# Patient Record
Sex: FEMALE | Race: OTHER | HISPANIC OR LATINO | URBAN - METROPOLITAN AREA
[De-identification: names, ages, dates, MRNs, and addresses within clinical notes are randomized per-mention and may not be internally consistent; named-entity substitution may affect disease eponyms.]

---

## 2019-11-14 ENCOUNTER — OUTPATIENT (OUTPATIENT)
Dept: OUTPATIENT SERVICES | Facility: HOSPITAL | Age: 59
LOS: 1 days | End: 2019-11-14
Payer: COMMERCIAL

## 2019-11-14 VITALS
RESPIRATION RATE: 16 BRPM | DIASTOLIC BLOOD PRESSURE: 93 MMHG | SYSTOLIC BLOOD PRESSURE: 161 MMHG | HEART RATE: 78 BPM | OXYGEN SATURATION: 100 % | WEIGHT: 156.09 LBS | HEIGHT: 64 IN | TEMPERATURE: 98 F

## 2019-11-14 DIAGNOSIS — Z98.891 HISTORY OF UTERINE SCAR FROM PREVIOUS SURGERY: Chronic | ICD-10-CM

## 2019-11-14 DIAGNOSIS — K60.1 CHRONIC ANAL FISSURE: ICD-10-CM

## 2019-11-14 DIAGNOSIS — Z01.818 ENCOUNTER FOR OTHER PREPROCEDURAL EXAMINATION: ICD-10-CM

## 2019-11-14 DIAGNOSIS — Z98.890 OTHER SPECIFIED POSTPROCEDURAL STATES: Chronic | ICD-10-CM

## 2019-11-14 DIAGNOSIS — Z86.018 PERSONAL HISTORY OF OTHER BENIGN NEOPLASM: Chronic | ICD-10-CM

## 2019-11-14 PROBLEM — Z00.00 ENCOUNTER FOR PREVENTIVE HEALTH EXAMINATION: Status: ACTIVE | Noted: 2019-11-14

## 2019-11-14 LAB
ANION GAP SERPL CALC-SCNC: 6 MMOL/L — SIGNIFICANT CHANGE UP (ref 5–17)
BUN SERPL-MCNC: 15 MG/DL — SIGNIFICANT CHANGE UP (ref 7–23)
CALCIUM SERPL-MCNC: 9.6 MG/DL — SIGNIFICANT CHANGE UP (ref 8.5–10.1)
CHLORIDE SERPL-SCNC: 105 MMOL/L — SIGNIFICANT CHANGE UP (ref 96–108)
CO2 SERPL-SCNC: 30 MMOL/L — SIGNIFICANT CHANGE UP (ref 22–31)
CREAT SERPL-MCNC: 0.68 MG/DL — SIGNIFICANT CHANGE UP (ref 0.5–1.3)
GLUCOSE SERPL-MCNC: 88 MG/DL — SIGNIFICANT CHANGE UP (ref 70–99)
HCT VFR BLD CALC: 46.3 % — HIGH (ref 34.5–45)
HGB BLD-MCNC: 15.6 G/DL — HIGH (ref 11.5–15.5)
MCHC RBC-ENTMCNC: 28.7 PG — SIGNIFICANT CHANGE UP (ref 27–34)
MCHC RBC-ENTMCNC: 33.7 GM/DL — SIGNIFICANT CHANGE UP (ref 32–36)
MCV RBC AUTO: 85.1 FL — SIGNIFICANT CHANGE UP (ref 80–100)
NRBC # BLD: 0 /100 WBCS — SIGNIFICANT CHANGE UP (ref 0–0)
PLATELET # BLD AUTO: 313 K/UL — SIGNIFICANT CHANGE UP (ref 150–400)
POTASSIUM SERPL-MCNC: 4.2 MMOL/L — SIGNIFICANT CHANGE UP (ref 3.5–5.3)
POTASSIUM SERPL-SCNC: 4.2 MMOL/L — SIGNIFICANT CHANGE UP (ref 3.5–5.3)
RBC # BLD: 5.44 M/UL — HIGH (ref 3.8–5.2)
RBC # FLD: 12.8 % — SIGNIFICANT CHANGE UP (ref 10.3–14.5)
SODIUM SERPL-SCNC: 141 MMOL/L — SIGNIFICANT CHANGE UP (ref 135–145)
WBC # BLD: 5.96 K/UL — SIGNIFICANT CHANGE UP (ref 3.8–10.5)
WBC # FLD AUTO: 5.96 K/UL — SIGNIFICANT CHANGE UP (ref 3.8–10.5)

## 2019-11-14 PROCEDURE — 93005 ELECTROCARDIOGRAM TRACING: CPT

## 2019-11-14 PROCEDURE — 36415 COLL VENOUS BLD VENIPUNCTURE: CPT

## 2019-11-14 PROCEDURE — 93010 ELECTROCARDIOGRAM REPORT: CPT

## 2019-11-14 PROCEDURE — 85027 COMPLETE CBC AUTOMATED: CPT

## 2019-11-14 PROCEDURE — G0463: CPT

## 2019-11-14 PROCEDURE — 80048 BASIC METABOLIC PNL TOTAL CA: CPT

## 2019-11-14 RX ORDER — LEVOCETIRIZINE DIHYDROCHLORIDE 0.5 MG/ML
0 SOLUTION ORAL
Qty: 90 | Refills: 0 | DISCHARGE

## 2019-11-14 RX ORDER — PLECANATIDE 3 MG/1
0 TABLET ORAL
Qty: 90 | Refills: 0 | DISCHARGE

## 2019-11-14 RX ORDER — AMLODIPINE BESYLATE 2.5 MG/1
0 TABLET ORAL
Qty: 90 | Refills: 0 | DISCHARGE

## 2019-11-14 RX ORDER — LIFITEGRAST 50 MG/ML
0 SOLUTION/ DROPS OPHTHALMIC
Qty: 180 | Refills: 0 | DISCHARGE

## 2019-11-14 NOTE — H&P PST ADULT - NSICDXPASTSURGICALHX_GEN_ALL_CORE_FT
PAST SURGICAL HISTORY:  H/O colonoscopy     History of 2  sections     S/P excision of lipoma right breast 30 years ago

## 2019-11-14 NOTE — H&P PST ADULT - HISTORY OF PRESENT ILLNESS
58 yo  female with HTN, presents to PST scheduled for exam under anesthesia  - placement of seton - anal biopsy on 11/21/2019 with Dr. Barrera 58 yo female with HTN, presents to PST scheduled for exam under anesthesia  - placement of seton - anal biopsy on 11/21/2019 with Dr. Barrera. Reports H/O intermittent flare ups of hemorrhoids. Noticed a rectal bump over a month ago. Dx with a fissure. Reports discomfort

## 2019-11-14 NOTE — H&P PST ADULT - NSICDXPROBLEM_GEN_ALL_CORE_FT
PROBLEM DIAGNOSES  Problem: Chronic anal fissure  Assessment and Plan: scheduled for exam under anesthesia  - placement of seton - anal biopsy on 11/21/2019 with Dr. Barrera       Problem: Pre-op evaluation  Assessment and Plan: Labs - CBC, BMP and EKG  MC with Dr. Yeh  Pre op  instructions reviewed and given. Take routine am meds DOS with sip of water. Avoid NSAIDs and OTC supplements. Verbalized understanding PROBLEM DIAGNOSES  Problem: Chronic anal fissure  Assessment and Plan: scheduled for exam under anesthesia  - placement of seton - anal biopsy on 11/21/2019 with Dr. Barrera       Problem: Pre-op evaluation  Assessment and Plan: Labs - CBC, BMP and EKG  MC with Dr. Mariscal  Pre op  instructions reviewed and given. Take all routine meds at night. Avoid NSAIDs and OTC supplements. Verbalized understanding

## 2019-11-14 NOTE — H&P PST ADULT - ASSESSMENT
s59 yo female with a chronic anal fissure scheduled for exam under anesthesia  - placement of seton - anal biopsy on 11/21/2019 with Dr. Barrera

## 2019-11-14 NOTE — H&P PST ADULT - NSANTHOSAYNRD_GEN_A_CORE
No. YUNIEL screening performed.  STOP BANG Legend: 0-2 = LOW Risk; 3-4 = INTERMEDIATE Risk; 5-8 = HIGH Risk

## 2019-11-20 ENCOUNTER — TRANSCRIPTION ENCOUNTER (OUTPATIENT)
Age: 59
End: 2019-11-20

## 2019-11-20 NOTE — ASU PATIENT PROFILE, ADULT - PMH
Chronic anal fissure    Constipation    Dry eyes, bilateral    History of hemorrhoids    HLD (hyperlipidemia)    HTN (hypertension)    Seasonal allergies

## 2019-11-21 ENCOUNTER — RESULT REVIEW (OUTPATIENT)
Age: 59
End: 2019-11-21

## 2019-11-21 ENCOUNTER — OUTPATIENT (OUTPATIENT)
Dept: OUTPATIENT SERVICES | Facility: HOSPITAL | Age: 59
LOS: 1 days | End: 2019-11-21
Payer: COMMERCIAL

## 2019-11-21 VITALS
TEMPERATURE: 98 F | SYSTOLIC BLOOD PRESSURE: 110 MMHG | RESPIRATION RATE: 12 BRPM | DIASTOLIC BLOOD PRESSURE: 64 MMHG | OXYGEN SATURATION: 99 % | HEART RATE: 72 BPM

## 2019-11-21 VITALS
RESPIRATION RATE: 16 BRPM | HEIGHT: 64 IN | HEART RATE: 71 BPM | TEMPERATURE: 98 F | OXYGEN SATURATION: 98 % | WEIGHT: 156.09 LBS | DIASTOLIC BLOOD PRESSURE: 79 MMHG | SYSTOLIC BLOOD PRESSURE: 131 MMHG

## 2019-11-21 DIAGNOSIS — K60.1 CHRONIC ANAL FISSURE: ICD-10-CM

## 2019-11-21 DIAGNOSIS — Z98.891 HISTORY OF UTERINE SCAR FROM PREVIOUS SURGERY: Chronic | ICD-10-CM

## 2019-11-21 DIAGNOSIS — Z01.818 ENCOUNTER FOR OTHER PREPROCEDURAL EXAMINATION: ICD-10-CM

## 2019-11-21 DIAGNOSIS — Z98.890 OTHER SPECIFIED POSTPROCEDURAL STATES: Chronic | ICD-10-CM

## 2019-11-21 PROCEDURE — 88305 TISSUE EXAM BY PATHOLOGIST: CPT | Mod: 26

## 2019-11-21 PROCEDURE — 88305 TISSUE EXAM BY PATHOLOGIST: CPT

## 2019-11-21 PROCEDURE — C1889: CPT

## 2019-11-21 PROCEDURE — 46270 REMOVE ANAL FIST SUBQ: CPT

## 2019-11-21 RX ORDER — SODIUM CHLORIDE 9 MG/ML
1000 INJECTION, SOLUTION INTRAVENOUS
Refills: 0 | Status: DISCONTINUED | OUTPATIENT
Start: 2019-11-21 | End: 2019-11-21

## 2019-11-21 RX ORDER — LEVOCETIRIZINE DIHYDROCHLORIDE 0.5 MG/ML
1 SOLUTION ORAL
Qty: 0 | Refills: 0 | DISCHARGE

## 2019-11-21 RX ORDER — PLECANATIDE 3 MG/1
0 TABLET ORAL
Qty: 0 | Refills: 0 | DISCHARGE

## 2019-11-21 RX ORDER — AMLODIPINE BESYLATE 2.5 MG/1
1 TABLET ORAL
Qty: 0 | Refills: 0 | DISCHARGE

## 2019-11-21 RX ORDER — OXYCODONE HYDROCHLORIDE 5 MG/1
5 TABLET ORAL ONCE
Refills: 0 | Status: DISCONTINUED | OUTPATIENT
Start: 2019-11-21 | End: 2019-11-21

## 2019-11-21 RX ORDER — HYDROMORPHONE HYDROCHLORIDE 2 MG/ML
0.5 INJECTION INTRAMUSCULAR; INTRAVENOUS; SUBCUTANEOUS
Refills: 0 | Status: DISCONTINUED | OUTPATIENT
Start: 2019-11-21 | End: 2019-11-21

## 2019-11-21 RX ORDER — ONDANSETRON 8 MG/1
4 TABLET, FILM COATED ORAL ONCE
Refills: 0 | Status: DISCONTINUED | OUTPATIENT
Start: 2019-11-21 | End: 2019-11-21

## 2019-11-21 RX ORDER — LIFITEGRAST 50 MG/ML
0 SOLUTION/ DROPS OPHTHALMIC
Qty: 0 | Refills: 0 | DISCHARGE

## 2019-11-21 RX ADMIN — SODIUM CHLORIDE 50 MILLILITER(S): 9 INJECTION, SOLUTION INTRAVENOUS at 06:36

## 2019-11-21 RX ADMIN — SODIUM CHLORIDE 75 MILLILITER(S): 9 INJECTION, SOLUTION INTRAVENOUS at 08:52

## 2019-11-21 NOTE — ASU DISCHARGE PLAN (ADULT/PEDIATRIC) - CARE PROVIDER_API CALL
Veronique Barrera)  ColonRectal Surgery; Surgery  [address] [facility], [address]  [place] [address]  Phone: [phone]  Fax: [phone]  Established Patient  Follow Up Time: 2 weeks

## 2022-12-14 ENCOUNTER — OFFICE (OUTPATIENT)
Dept: URBAN - METROPOLITAN AREA CLINIC 112 | Facility: CLINIC | Age: 62
Setting detail: OPHTHALMOLOGY
End: 2022-12-14
Payer: COMMERCIAL

## 2022-12-14 DIAGNOSIS — H52.03: ICD-10-CM

## 2022-12-14 DIAGNOSIS — H40.013: ICD-10-CM

## 2022-12-14 DIAGNOSIS — H04.122: ICD-10-CM

## 2022-12-14 DIAGNOSIS — H04.121: ICD-10-CM

## 2022-12-14 DIAGNOSIS — H52.4: ICD-10-CM

## 2022-12-14 PROCEDURE — 92004 COMPRE OPH EXAM NEW PT 1/>: CPT | Performed by: OPHTHALMOLOGY

## 2022-12-14 ASSESSMENT — REFRACTION_CURRENTRX
OD_AXIS: 059
OS_SPHERE: +1.50
OS_SPHERE: +1.25
OD_CYLINDER: -0.50
OD_OVR_VA: 20/
OS_CYLINDER: -0.50
OD_CYLINDER: -0.50
OS_AXIS: 085
OS_AXIS: 091
OD_ADD: +2.25
OS_OVR_VA: 20/
OS_CYLINDER: -0.50
OD_VPRISM_DIRECTION: PROGS
OD_ADD: +2.00
OD_VPRISM_DIRECTION: SV
OS_ADD: +2.25
OD_SPHERE: +1.25
OS_VPRISM_DIRECTION: SV
OS_ADD: +2.00
OD_AXIS: 052
OS_VPRISM_DIRECTION: PROGS
OD_OVR_VA: 20/
OS_OVR_VA: 20/
OD_SPHERE: +1.50

## 2022-12-14 ASSESSMENT — KERATOMETRY
OS_AXISANGLE_DEGREES: 054
OS_K2POWER_DIOPTERS: 42.50
METHOD_AUTO_MANUAL: AUTO
OD_AXISANGLE_DEGREES: 167
OD_K2POWER_DIOPTERS: 41.75
OS_K1POWER_DIOPTERS: 42.00
OD_K1POWER_DIOPTERS: 41.00

## 2022-12-14 ASSESSMENT — PACHYMETRY
OD_CT_CORRECTION: -4
OS_CT_UM: 603
OS_CT_CORRECTION: -4
OD_CT_UM: 604

## 2022-12-14 ASSESSMENT — REFRACTION_MANIFEST
OD_VA1: 20/30+
OS_ADD: +2.00
OD_CYLINDER: -0.75
OD_AXIS: 75
OS_VA1: 20/30+
OD_SPHERE: +1.50
OS_AXIS: 90
OS_CYLINDER: -0.50
OS_SPHERE: +1.25
OD_ADD: +2.00

## 2022-12-14 ASSESSMENT — AXIALLENGTH_DERIVED
OD_AL: 23.9392
OS_AL: 23.6616
OS_AL: 23.4673
OD_AL: 23.642

## 2022-12-14 ASSESSMENT — SPHEQUIV_DERIVED
OD_SPHEQUIV: 1.125
OD_SPHEQUIV: 1.875
OS_SPHEQUIV: 1
OS_SPHEQUIV: 1.5

## 2022-12-14 ASSESSMENT — TONOMETRY: OD_IOP_MMHG: 21

## 2022-12-14 ASSESSMENT — VISUAL ACUITY
OD_BCVA: 20/25-1
OS_BCVA: 20/30

## 2022-12-14 ASSESSMENT — CONFRONTATIONAL VISUAL FIELD TEST (CVF)
OS_FINDINGS: FULL
OD_FINDINGS: FULL

## 2022-12-14 ASSESSMENT — REFRACTION_AUTOREFRACTION
OD_AXIS: 076
OD_SPHERE: +2.50
OD_CYLINDER: -1.25
OS_CYLINDER: -0.50
OS_AXIS: 084
OS_SPHERE: +1.75

## 2023-04-30 NOTE — ASU PREOP CHECKLIST - HEIGHT IN CM
RN NOTES



UPON ROUNDS NOTED REDNESS AND SWELLING ON IV SITE. REMOVED AND ICED PACK PLACED. 



INSERTED NEW IV ACCESS AT LEFT HAND #20G WITH NS1L X 125ML/HR INFUSING WELL. 

WILL CONTINUE TO MONITOR 162.56

## 2023-06-07 ENCOUNTER — OFFICE (OUTPATIENT)
Dept: URBAN - METROPOLITAN AREA CLINIC 112 | Facility: CLINIC | Age: 63
Setting detail: OPHTHALMOLOGY
End: 2023-06-07
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
## 2023-06-07 PROCEDURE — NO SHOW FE NO SHOW FEE: Performed by: OPHTHALMOLOGY

## 2024-02-29 ENCOUNTER — OFFICE (OUTPATIENT)
Dept: URBAN - METROPOLITAN AREA CLINIC 94 | Facility: CLINIC | Age: 64
Setting detail: OPHTHALMOLOGY
End: 2024-02-29
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
## 2024-02-29 PROCEDURE — NO SHOW FE NO SHOW FEE: Performed by: OPHTHALMOLOGY

## 2025-06-23 ENCOUNTER — OFFICE (OUTPATIENT)
Dept: URBAN - METROPOLITAN AREA CLINIC 112 | Facility: CLINIC | Age: 65
Setting detail: OPHTHALMOLOGY
End: 2025-06-23
Payer: COMMERCIAL

## 2025-06-23 DIAGNOSIS — H04.122: ICD-10-CM

## 2025-06-23 DIAGNOSIS — H04.121: ICD-10-CM

## 2025-06-23 DIAGNOSIS — H52.4: ICD-10-CM

## 2025-06-23 DIAGNOSIS — H40.013: ICD-10-CM

## 2025-06-23 PROCEDURE — 92250 FUNDUS PHOTOGRAPHY W/I&R: CPT | Performed by: OPTOMETRIST

## 2025-06-23 PROCEDURE — 92014 COMPRE OPH EXAM EST PT 1/>: CPT | Performed by: OPTOMETRIST

## 2025-06-23 PROCEDURE — 92015 DETERMINE REFRACTIVE STATE: CPT | Performed by: OPTOMETRIST

## 2025-06-23 PROCEDURE — 92083 EXTENDED VISUAL FIELD XM: CPT | Performed by: OPTOMETRIST

## 2025-06-23 ASSESSMENT — REFRACTION_MANIFEST
OS_CYLINDER: -0.50
OD_AXIS: 065
OD_CYLINDER: -0.75
OS_CYLINDER: -0.50
OD_SPHERE: +2.75
OD_CYLINDER: -0.75
OD_VA1: 20/25
OS_ADD: +2.50
OS_VA1: 20/25
OD_VA1: 20/30+
OS_ADD: +2.00
OS_SPHERE: +1.25
OD_AXIS: 75
OS_SPHERE: +2.25
OS_AXIS: 90
OD_ADD: +2.50
OD_SPHERE: +1.50
OS_VA1: 20/30+
OD_ADD: +2.00
OS_AXIS: 090

## 2025-06-23 ASSESSMENT — REFRACTION_CURRENTRX
OD_VPRISM_DIRECTION: SV
OS_CYLINDER: -0.50
OS_SPHERE: +1.25
OD_OVR_VA: 20/
OD_AXIS: 067
OS_OVR_VA: 20/
OD_AXIS: 059
OD_SPHERE: +1.25
OD_CYLINDER: -0.50
OS_OVR_VA: 20/
OD_CYLINDER: -0.25
OS_SPHERE: +1.50
OS_ADD: +2.25
OS_VPRISM_DIRECTION: SV
OD_SPHERE: +1.50
OD_ADD: +2.00
OD_OVR_VA: 20/
OS_AXIS: 085
OD_VPRISM_DIRECTION: PROGS
OD_ADD: +2.25
OS_SPHERE: +1.75
OS_CYLINDER: -0.50
OS_ADD: +2.00
OD_CYLINDER: -0.50
OS_CYLINDER: -0.25
OD_OVR_VA: 20/
OS_AXIS: 071
OS_ADD: +2.25
OD_AXIS: 052
OS_AXIS: 091
OS_OVR_VA: 20/
OS_VPRISM_DIRECTION: PROGS
OD_ADD: +2.25
OD_SPHERE: +1.75

## 2025-06-23 ASSESSMENT — PACHYMETRY
OS_CT_UM: 603
OD_CT_CORRECTION: -4
OD_CT_UM: 604
OS_CT_CORRECTION: -4

## 2025-06-23 ASSESSMENT — CONFRONTATIONAL VISUAL FIELD TEST (CVF)
OD_FINDINGS: FULL
OS_FINDINGS: FULL

## 2025-06-23 ASSESSMENT — KERATOMETRY
METHOD_AUTO_MANUAL: AUTO
OD_AXISANGLE_DEGREES: 154
OS_K2POWER_DIOPTERS: 42.00
OD_K2POWER_DIOPTERS: 41.50
OS_AXISANGLE_DEGREES: 001
OS_K1POWER_DIOPTERS: 41.75
OD_K1POWER_DIOPTERS: 41.25

## 2025-06-23 ASSESSMENT — REFRACTION_AUTOREFRACTION
OD_SPHERE: +3.00
OS_CYLINDER: -0.75
OS_SPHERE: +2.50
OD_AXIS: 065
OS_AXIS: 089
OD_CYLINDER: -1.00

## 2025-06-23 ASSESSMENT — VISUAL ACUITY
OD_BCVA: 20/50
OS_BCVA: 20/60

## 2025-07-01 ENCOUNTER — OFFICE (OUTPATIENT)
Dept: URBAN - METROPOLITAN AREA CLINIC 112 | Facility: CLINIC | Age: 65
Setting detail: OPHTHALMOLOGY
End: 2025-07-01
Payer: COMMERCIAL

## 2025-07-01 DIAGNOSIS — H04.122: ICD-10-CM

## 2025-07-01 DIAGNOSIS — H40.013: ICD-10-CM

## 2025-07-01 DIAGNOSIS — H04.121: ICD-10-CM

## 2025-07-01 PROCEDURE — 92133 CPTRZD OPH DX IMG PST SGM ON: CPT | Performed by: OPHTHALMOLOGY

## 2025-07-01 PROCEDURE — 92012 INTRM OPH EXAM EST PATIENT: CPT | Performed by: OPHTHALMOLOGY

## 2025-07-01 ASSESSMENT — REFRACTION_CURRENTRX
OS_AXIS: 085
OD_ADD: +2.25
OS_AXIS: 071
OD_AXIS: 067
OS_ADD: +2.25
OS_SPHERE: +1.25
OD_OVR_VA: 20/
OD_ADD: +2.00
OD_OVR_VA: 20/
OS_AXIS: 091
OD_ADD: +2.25
OD_SPHERE: +1.50
OS_VPRISM_DIRECTION: PROGS
OD_AXIS: 052
OS_OVR_VA: 20/
OD_CYLINDER: -0.50
OS_ADD: +2.00
OS_CYLINDER: -0.50
OS_ADD: +2.25
OD_VPRISM_DIRECTION: SV
OD_CYLINDER: -0.25
OS_CYLINDER: -0.25
OS_CYLINDER: -0.50
OD_SPHERE: +1.75
OS_OVR_VA: 20/
OS_SPHERE: +1.50
OD_SPHERE: +1.25
OD_VPRISM_DIRECTION: PROGS
OD_OVR_VA: 20/
OS_OVR_VA: 20/
OD_CYLINDER: -0.50
OS_VPRISM_DIRECTION: SV
OD_AXIS: 059
OS_SPHERE: +1.75

## 2025-07-01 ASSESSMENT — CONFRONTATIONAL VISUAL FIELD TEST (CVF)
OS_FINDINGS: FULL
OD_FINDINGS: FULL

## 2025-07-01 ASSESSMENT — KERATOMETRY
OD_K1POWER_DIOPTERS: 41.00
METHOD_AUTO_MANUAL: AUTO
OD_AXISANGLE_DEGREES: 161
OS_AXISANGLE_DEGREES: 142
OS_K2POWER_DIOPTERS: 42.25
OS_K1POWER_DIOPTERS: 41.75
OD_K2POWER_DIOPTERS: 41.50

## 2025-07-01 ASSESSMENT — TONOMETRY
OD_IOP_MMHG: 18
OS_IOP_MMHG: 18

## 2025-07-01 ASSESSMENT — REFRACTION_MANIFEST
OD_VA1: 20/30+
OD_CYLINDER: -0.75
OS_ADD: +2.00
OD_SPHERE: +2.75
OD_ADD: +2.50
OD_ADD: +2.00
OS_SPHERE: +2.25
OD_AXIS: 75
OS_ADD: +2.50
OS_CYLINDER: -0.50
OD_SPHERE: +1.50
OD_CYLINDER: -0.75
OD_VA1: 20/25
OD_AXIS: 065
OS_CYLINDER: -0.50
OS_VA1: 20/25
OS_AXIS: 090
OS_VA1: 20/30+
OS_AXIS: 90
OS_SPHERE: +1.25

## 2025-07-01 ASSESSMENT — VISUAL ACUITY
OD_BCVA: 20/30+1
OS_BCVA: 20/40

## 2025-07-01 ASSESSMENT — PACHYMETRY
OS_CT_CORRECTION: -4
OD_CT_UM: 604
OS_CT_UM: 603
OD_CT_CORRECTION: -4

## 2025-07-01 ASSESSMENT — REFRACTION_AUTOREFRACTION
OD_SPHERE: +2.75
OD_CYLINDER: -0.75
OS_CYLINDER: -0.75
OS_SPHERE: +2.50
OD_AXIS: 064
OS_AXIS: 088